# Patient Record
Sex: MALE | Race: WHITE | NOT HISPANIC OR LATINO | Employment: OTHER | URBAN - METROPOLITAN AREA
[De-identification: names, ages, dates, MRNs, and addresses within clinical notes are randomized per-mention and may not be internally consistent; named-entity substitution may affect disease eponyms.]

---

## 2020-12-22 DIAGNOSIS — M79.605 BILATERAL LEG PAIN: Primary | ICD-10-CM

## 2020-12-22 DIAGNOSIS — M79.604 BILATERAL LEG PAIN: Primary | ICD-10-CM

## 2020-12-29 ENCOUNTER — APPOINTMENT (OUTPATIENT)
Dept: REHABILITATION | Age: 45
End: 2020-12-29
Attending: INTERNAL MEDICINE

## 2020-12-31 ENCOUNTER — APPOINTMENT (OUTPATIENT)
Dept: REHABILITATION | Age: 45
End: 2020-12-31
Attending: INTERNAL MEDICINE

## 2023-08-01 ENCOUNTER — LAB ENCOUNTER (OUTPATIENT)
Dept: LAB | Facility: REFERENCE LAB | Age: 48
End: 2023-08-01
Attending: UROLOGY

## 2023-08-01 DIAGNOSIS — Z11.3 SCREENING EXAMINATION FOR VENEREAL DISEASE: Primary | ICD-10-CM

## 2023-08-01 LAB
HAV IGM SER QL: NONREACTIVE
HBV SURFACE AG SER-ACNC: 0.15 [IU]/L
HBV SURFACE AG SERPL QL IA: NONREACTIVE
HCV AB SERPL QL IA: NONREACTIVE

## 2023-08-01 PROCEDURE — 87389 HIV-1 AG W/HIV-1&-2 AB AG IA: CPT

## 2023-08-01 PROCEDURE — 87340 HEPATITIS B SURFACE AG IA: CPT

## 2023-08-01 PROCEDURE — 86780 TREPONEMA PALLIDUM: CPT

## 2023-08-01 PROCEDURE — 86709 HEPATITIS A IGM ANTIBODY: CPT

## 2023-08-01 PROCEDURE — 87591 N.GONORRHOEAE DNA AMP PROB: CPT

## 2023-08-01 PROCEDURE — 36415 COLL VENOUS BLD VENIPUNCTURE: CPT

## 2023-08-01 PROCEDURE — 86803 HEPATITIS C AB TEST: CPT

## 2023-08-01 PROCEDURE — 86696 HERPES SIMPLEX TYPE 2 TEST: CPT

## 2023-08-01 PROCEDURE — 87491 CHLMYD TRACH DNA AMP PROBE: CPT

## 2023-08-01 PROCEDURE — 86695 HERPES SIMPLEX TYPE 1 TEST: CPT

## 2023-08-02 LAB
C TRACH DNA SPEC QL NAA+PROBE: NEGATIVE
N GONORRHOEA DNA SPEC QL NAA+PROBE: NEGATIVE

## 2023-08-03 LAB
HSV 1 GLYCOPROTEIN G, IGG: POSITIVE
HSV 2 GLYCOPROTEIN G, IGG: NEGATIVE
T PALLIDUM AB SER QL: NEGATIVE

## 2025-04-14 NOTE — ED INITIAL ASSESSMENT (HPI)
Pt came in due to cough, congestion, sore throat, voice loss, and fatigue for the past week. Pt has easy non labored respirations.

## 2025-04-14 NOTE — ED PROVIDER NOTES
Patient Seen in: [facility]    History     Chief Complaint   Patient presents with    Cough/URI         Subjective:   HPI  Lalo Juan Antonio Payne is a 49 year old male here for sore throat cough, and congestion for 8 days-9 days. Worse in the morning and worse at night time. Otc meds tyl. No cold meds. Afrin with some relief.  Feels like he is in a fog; no unilateral weakness, worst headache of his life, or vision changes.         Objective:     History reviewed. No pertinent past medical history.           History reviewed. No pertinent surgical history.             No pertinent social history.            Review of Systems    Positive for stated complaint: cough, sore throat, runny nose, ear pain  Other systems are as noted in HPI.  Constitutional and vital signs reviewed.      All other systems reviewed and negative except as noted above.    Physical Exam     ED Triage Vitals [04/14/25 1035]   /85   Pulse 65   Resp 20   Temp 99 °F (37.2 °C)   Temp src Oral   SpO2 100 %   O2 Device None (Room air)       Current Vitals:   Vital Signs  BP: 143/85  Pulse: 65  Resp: 20  Temp: 99 °F (37.2 °C)  Temp src: Oral    Oxygen Therapy  SpO2: 100 %  O2 Device: None (Room air)        Physical Exam  Vitals and nursing note reviewed.   Constitutional:       General: He is not in acute distress.     Appearance: Normal appearance. He is not ill-appearing or toxic-appearing.   HENT:      Head: Normocephalic.      Right Ear: External ear normal.      Left Ear: External ear normal.      Nose: Congestion and rhinorrhea present. Rhinorrhea is purulent.      Right Turbinates: Swollen (RT > LT).      Left Turbinates: Swollen.      Right Sinus: Maxillary sinus tenderness and frontal sinus tenderness present.      Left Sinus: Maxillary sinus tenderness and frontal sinus tenderness present.      Mouth/Throat:      Mouth: Mucous membranes are moist.      Pharynx: Uvula midline. Posterior oropharyngeal erythema and  uvula swelling (mild; no obstruction) present. No oropharyngeal exudate.   Eyes:      Conjunctiva/sclera: Conjunctivae normal.      Pupils: Pupils are equal, round, and reactive to light.   Cardiovascular:      Rate and Rhythm: Normal rate.      Pulses: Normal pulses.   Pulmonary:      Effort: Pulmonary effort is normal.   Musculoskeletal:      Cervical back: Normal range of motion. No erythema or rigidity. No pain with movement, spinous process tenderness or muscular tenderness. Normal range of motion.   Lymphadenopathy:      Cervical: No cervical adenopathy.      Right cervical: No superficial, deep or posterior cervical adenopathy.     Left cervical: No superficial, deep or posterior cervical adenopathy.   Skin:     General: Skin is warm.      Capillary Refill: Capillary refill takes less than 2 seconds.      Findings: No lesion or rash.   Neurological:      General: No focal deficit present.      Mental Status: He is alert and oriented to person, place, and time.   Psychiatric:         Mood and Affect: Mood normal.         Behavior: Behavior normal.         Thought Content: Thought content normal.         Judgment: Judgment normal.             ED Course     Labs Reviewed   POCT RAPID STREP - Normal   POCT FLU TEST - Normal    Narrative:     This assay is a rapid molecular in vitro test utilizing nucleic acid amplification of influenza A and B viral RNA.   RAPID SARS-COV-2 BY PCR - Normal                                 MDM           Medical Decision Making  Ddx: Acute sinusitis, atypical headache, URI vs LRI, allergies, reactive, COVID, FLU, RSV, or somatic causes of symptoms    Treat for bacterial sinusitis, and bronchospasms.  Antibiotic, Tessalon Perles, and inhaler sent to pharmacy to use as directed.  Supportive care include but not limited to otc cold medications if there is no contraindication, cool mist humidifier, and oral hydration.  Avoid dairy if possible; This increases mucus production.     No  stridor, No hot muffled speech, and no signs of compromise. Tolerating PO. Neuro wnl.   Reinforced ER precautions, and f/u care as needed. All questions answered, and reassurance given. No acute distress and cleared for home.      Problems Addressed:  Acute non-recurrent pansinusitis: acute illness or injury  Bronchospasm: acute illness or injury  Cough: acute illness or injury    Amount and/or Complexity of Data Reviewed  External Data Reviewed: notes.  Labs: ordered. Decision-making details documented in ED Course.     Details: Independent interpretation. Reviewed with patient, and family    Risk  OTC drugs.  Prescription drug management.        Disposition and Plan     Clinical Impression:  1. Acute non-recurrent pansinusitis    2. Cough    3. Bronchospasm         Disposition:  Discharge  4/14/2025 11:16 am    Follow-up:  Shola Tsai DO  [address] [address]  [phone]    Schedule an appointment as soon as possible for a visit       pcp    Schedule an appointment as soon as possible for a visit in 3 days            Medications Prescribed:  Discharge Medication List as of 4/14/2025 11:16 AM        START taking these medications    Details   benzonatate 100 MG Oral Cap Take 1 capsule (100 mg total) by mouth 3 (three) times daily as needed for cough., Normal, Disp-30 capsule, R-0NPI [id number].  Collaborating physician Shameka Stokes.      albuterol 108 (90 Base) MCG/ACT Inhalation Aero Soln Inhale 1 puff and hold breath for 10 seconds then exhale.  Wait 1 full minute and repeat for second puff.  Use every 4-6 hours as needed., Normal, Disp-1 each, R-0NPI [id number]. Collaborating MD Shameka Stokes.      Doxycycline Monohydrate 100 MG Oral Cap Take 1 capsule (100 mg total) by mouth 2 (two) times daily for 10 days., Normal, Disp-20 capsule, R-0NPI [id number].  Collaborating physician Shameka Stokes.             Supplementary Documentation:

## 2025-04-14 NOTE — DISCHARGE INSTRUCTIONS
The symptoms you are experiencing could be sinus infection related.    Doxycycline monohydrate sent to the pharmacy.  Take this twice a day for the next 10 full days.  Do not stop when you are feeling better.  This medication should not cause nausea, but take with food if that happens.  Decrease whey protein in the evening time.  Dairy products and increase mucus production, and inflammation.  Cool-mist humidifier at nighttime can also help out with coughing.   Recommend taking over-the-counter TheraFlu, or over-the-counter Mucinex.  This has Tylenol in the medication so do not double dose yourself.  Mucinex fast max all-in-one cold and flu multisymptom relief.  It is a white box, or white bottle with a [place] wheel on it that says all-in-one.  This will help thin your mucus  TheraFlu, and Mucinex both help out with cough.  One of the other.  Tessalon Perles is a cough suppressant.  This can be taken in between cold medication such as Mucinex, or TheraFlu.  If you take it at the same time it is okay, but works better for the cough in between cold medications  Albuterol inhaler.  Inhale 1 puff and hold your breath for 10 seconds.  After 10 seconds exhale.  Wait 1 to 2 minutes, and repeat the same for the second puff.  Do this every 4-6 hours as needed for coughing spasms.  Refills to be needed by primary care provider.  Please let them know that you are having the cough spasms especially after working out.   You can take the inhaler 40 minutes prior to working out to see if this helps out with postworkout symptoms.  Go to emergency department for any new or worsening symptoms such as shortness of breath, coughing up blood, or syncope (pass out)

## 2025-05-17 NOTE — ED PROVIDER NOTES
Patient Seen in: [facility]      History     Chief Complaint   Patient presents with    Neck Pain     Stated Complaint: Pinched nerve pain C5 and C6 region    Subjective:   HPI  History of Present Illness     Patient is a 49-year-old male that presents to the [facility] care center with concern for left-sided neck pain.  He has had pain in this area since the beginning of May, having sustained injury while working out in the gym.  He has been following with his primary care provider.  He had MRI recently pleated that was reported yesterday to show nerve impingement from C5-C7.  Patient has been taking a steroid Dosepak prescribed by his PCP.  He states he is unable to tolerate this due to the side effects.  He also started taking gabapentin just yesterday.  He is here today with concern for pain that is significant, causing disruption of sleep and activities of daily living.  Pain radiates down the left arm has had no motor or sensory deficit.              Objective:     History reviewed. No pertinent past medical history.           History reviewed. No pertinent surgical history.             Social History     Socioeconomic History    Marital status: Unknown   Tobacco Use    Smoking status: Never     Passive exposure: Never    Smokeless tobacco: Never     Social Drivers of Health     Food Insecurity: Low Risk  (12/2/2024)    Received from [facility]    Food Insecurity     Have there been times that your food ran out, and you didn't have money to get more?: No     Are there times that you worry that this might happen?: No   Transportation Needs: Low Risk  (12/2/2024)    Received from [facility]    Transportation Needs     Do you have trouble getting transportation to medical appointments?: No     How do you normally get to and from your appointments?: Other   Housing Stability: Low Risk  (12/2/2024)    Received from [facility]    Housing  Stability     Are you worried that your electric, gas, oil, or water might be shut off?: No     Are you concerned about having a safe and reliable place to live?: No              Review of Systems   Constitutional: Negative.    Cardiovascular:  Negative for chest pain.   Musculoskeletal:  Positive for neck pain.   Skin:  Negative for rash.   Neurological:  Negative for dizziness, weakness, numbness and headaches.       Positive for stated complaint: Pinched nerve pain C5 and C6 region  Other systems are as noted in HPI.  Constitutional and vital signs reviewed.      All other systems reviewed and negative except as noted above.                  Physical Exam     ED Triage Vitals   BP    Pulse    Resp    Temp    Temp src    SpO2    O2 Device        Current Vitals:   No data recorded      Physical Exam  Vitals and nursing note reviewed.   Constitutional:       General: He is not in acute distress.     Appearance: He is not ill-appearing.   Pulmonary:      Breath sounds: Rhonchi present.   Musculoskeletal:      Cervical back: Normal range of motion and neck supple.   Skin:     General: Skin is warm and dry.   Neurological:      Mental Status: He is alert and oriented to person, place, and time.   Psychiatric:         Behavior: Behavior normal.                   ED Course   Labs Reviewed - No data to display  Medications   ketorolac (Toradol) 30 MG/ML injection 60 mg (has no administration in time range)          Results                              MDM      Patient has a scheduled appointment with her primary care provider in 2 days to discuss long-term treatment strategies.  He was encouraged to keep this appointment.          Medical Decision Making  Differential diagnoses considered today include, but are not exclusive of: fracture, dislocation, strain, sprain, vascular compromise, and nerve impingement syndrome.      Problems Addressed:  Neck pain: undiagnosed new problem with uncertain prognosis    Risk  OTC  drugs.  Prescription drug management.          Disposition and Plan     Clinical Impression:  1. Neck pain         Disposition:  Discharge  5/17/2025  8:42 am    Follow-up:  Your primary care provider  keep your scheduled appointment in 2 days              Medications Prescribed:  Current Discharge Medication List        START taking these medications    Details   naproxen 500 MG Oral Tab Take 1 tablet (500 mg total) by mouth 2 (two) times daily as needed.  Qty: 20 tablet, Refills: 0      tiZANidine 2 MG Oral Tab Take 1 tablet (2 mg total) by mouth every 6 (six) hours as needed (muscle cramping).  Qty: 15 tablet, Refills: 0      lidocaine 4 % External Patch Place 1 patch onto the skin daily for 7 days.  Qty: 7 patch, Refills: 0                   Supplementary Documentation:

## 2025-05-17 NOTE — ED INITIAL ASSESSMENT (HPI)
Pt present with pain to neck, per pt, \"pinched nerve to C5-C7 region\". Pt reports hx of MRI yesterday - diagnosed it.     Pain is worse at night, today pain rated at \"severe\". Pt has taken a steroid dose pack, \"couldn't take due to increased BP\".     Pt reports injuring while doing a seated chest press 5/2/25.

## (undated) NOTE — LETTER
Date & Time: 5/17/2025, 8:42 AM  Patient: Lalo Payne  Encounter Provider(s):    David Payne APRN       To Whom It May Concern:    Lalo Payne was seen and treated in our department on 5/17/2025. He should not return to work until 5/21/25.    If you have any questions or concerns, please do not hesitate to call.        _____________________________  Physician/APC Signature